# Patient Record
Sex: FEMALE | Race: WHITE | Employment: FULL TIME | URBAN - METROPOLITAN AREA
[De-identification: names, ages, dates, MRNs, and addresses within clinical notes are randomized per-mention and may not be internally consistent; named-entity substitution may affect disease eponyms.]

---

## 2017-06-30 ENCOUNTER — NURSE TRIAGE (OUTPATIENT)
Dept: NURSING | Facility: CLINIC | Age: 32
End: 2017-06-30

## 2017-06-30 NOTE — TELEPHONE ENCOUNTER
"Pt states on night of 6/28- AM of 6/29 she was out w/ friends and drank 3 bottles of beer. She reports she \"completely lost 5 hours of time\" and woke up AM of 6/29 w/ severe headache that lasted all day long. No vomiting or other sx. States she is a social drinker and when she does drink alcohol she has never experienced the above reaction before. Sx resolved completely.  She ate like usual that day, was not ill, etc. Pt asks, \"if someone put a roofie in my drink how long would it last?\" According to Micromedex, rohyprol ingested orally has onset time of 20-30 minutes with duration of 8 hours. Disc'd this with patient and advised to keep in mind everyone may react slightly differently. People can have similar reaction to alcohol itself.  She denies any signs of sexual assalt - no pain, bruising, vaginal pain/tears/bleeding.etc.. Asks if she could come in to get blood drawn to see if anything is there. Advised she may be seen at  and doctor there could order labs if appropriate. Diane Fontanez RN/FNA    Reason for Disposition    Caller has NON-URGENT medication question about med that PCP prescribed and triager unable to answer question    Additional Information    Negative: Drug overdose and nurse unable to answer question    Negative: Caller requesting information not related to medicine    Negative: Caller requesting a prescription for Strep throat and has a positive culture result    Negative: Rash while taking a medication or within 3 days of stopping it    Negative: Immunization reaction suspected    Negative: [1] Asthma and [2] having symptoms of asthma (cough, wheezing, etc)    Negative: MORE THAN A DOUBLE DOSE of a prescription or over-the-counter (OTC) drug    Negative: [1] DOUBLE DOSE (an extra dose or lesser amount) of over-the-counter (OTC) drug AND [2] any symptoms (e.g., dizziness, nausea, pain, sleepiness)    Negative: [1] DOUBLE DOSE (an extra dose or lesser amount) of prescription drug AND [2] any " "symptoms (e.g., dizziness, nausea, pain, sleepiness)    Negative: Took another person's prescription drug    Negative: [1] DOUBLE DOSE (an extra dose or lesser amount) of prescription drug AND [2] NO symptoms (Exception: a double dose of antibiotics)    Negative: Diabetes drug error or overdose (e.g., insulin or extra dose)    Negative: [1] Request for URGENT new prescription or refill of \"essential\" medication (i.e., likelihood of harm to patient if not taken) AND [2] triager unable to fill per unit policy    Negative: [1] Prescription not at pharmacy AND [2] was prescribed today by PCP    Negative: Pharmacy calling with prescription questions and triager unable to answer question    Negative: Caller has URGENT medication question about med that PCP prescribed and triager unable to answer question    Protocols used: MEDICATION QUESTION CALL-ADULT-    "

## 2017-08-19 ENCOUNTER — HEALTH MAINTENANCE LETTER (OUTPATIENT)
Age: 32
End: 2017-08-19

## 2019-11-06 ENCOUNTER — HEALTH MAINTENANCE LETTER (OUTPATIENT)
Age: 34
End: 2019-11-06

## 2020-09-07 ENCOUNTER — TELEPHONE (OUTPATIENT)
Dept: OPHTHALMOLOGY | Facility: CLINIC | Age: 35
End: 2020-09-07

## 2020-09-08 ENCOUNTER — TELEPHONE (OUTPATIENT)
Dept: OPHTHALMOLOGY | Facility: CLINIC | Age: 35
End: 2020-09-08

## 2020-09-08 NOTE — TELEPHONE ENCOUNTER
Paged by Dr. Dr. Alexander from [facility] inquiring regarding follow up.    Patient underwent injury door knob to the eye on 9/3 in [place] and some sort of surgical lacrimal repair (uncertain whether this was by ENT or ophthalmology, outside records pending). Presents with increased eye redness. Per OSH provider, patient's vision is stable since accident, pupils round reactive, and provider notes chemosis in the affected eye. Additionally provider noticing corneal abrasion on woods lamp and prescribing antibiotic ointment. No concern for iritis or RD per OSH provider. CT head wnl.     Recommended patient be evaluated by ophthalmology, provided [facility] phone number to schedule follow up. Provider was in agreement.     Elizabeth Birmingham MD  Ophthalmology PGY-2

## 2020-09-08 NOTE — TELEPHONE ENCOUNTER
Spoke to pt at [phone]  Pt seen in ED yesterday following eye injury 9-3-2020 -- on call eye provider consulted yesterday    Scheduled appt tomorrow with Dr. Duckworth    Pt with continued eye pain and blurred vision and would like to review seeing on call tonight    Note to on call for review and call back to review    Min Clayton RN 3:50 PM 09/08/20    --      Didier Faustin [phone]      Left message with direct number at [phone]    Min Clayton RN 2:32 PM 09/08/20        [facility]    Phone Message    May a detailed message be left on voicemail: yes     Reason for Call: Other: Pt was see in ER and was told to do a follow up in clinic ASAP.  Please follow up with the Pt.  Thank you.      Action Taken: Message routed to:  [facility] E[facility]    Travel Screening: Not Applicable

## 2020-09-08 NOTE — TELEPHONE ENCOUNTER
Called patient around 4:15pm. Patient reports that her vision in the affected eye is blurry and she has more pain than yesterday. She has been using the ofloxacin prescribed by the ED every 4 hours. She is concerned about the swelling of the white part of her eye and the eyelid swelling. Discussed with patient that she could come to the ED today for an eye exam if she is concerned or coming to clinic tomorrow if she can tolerate the pain and the blurry vision is stable. Patient is comfortable with coming to the clinic tomorrow. Advised the patient to call or come to the ED tonight if there is further increased pain and decreased vision. Told patient to continue using the ofloxacin eyedrop as instructed. Patient verbalized understanding and agreed with the plan.     Stalin Ricci MD  Ophthalmology PGY-3

## 2020-09-09 ENCOUNTER — OFFICE VISIT (OUTPATIENT)
Dept: OPHTHALMOLOGY | Facility: CLINIC | Age: 35
End: 2020-09-09

## 2020-09-09 ENCOUNTER — OFFICE VISIT (OUTPATIENT)
Dept: OPHTHALMOLOGY | Facility: CLINIC | Age: 35
End: 2020-09-09
Attending: OPHTHALMOLOGY

## 2020-09-09 ENCOUNTER — TELEPHONE (OUTPATIENT)
Dept: SURGERY | Facility: CLINIC | Age: 35
End: 2020-09-09

## 2020-09-09 ENCOUNTER — TELEPHONE (OUTPATIENT)
Dept: OPHTHALMOLOGY | Facility: CLINIC | Age: 35
End: 2020-09-09

## 2020-09-09 ENCOUNTER — PRE VISIT (OUTPATIENT)
Dept: SURGERY | Facility: CLINIC | Age: 35
End: 2020-09-09

## 2020-09-09 DIAGNOSIS — Z11.59 ENCOUNTER FOR SCREENING FOR OTHER VIRAL DISEASES: Primary | ICD-10-CM

## 2020-09-09 DIAGNOSIS — S01.112A: Primary | ICD-10-CM

## 2020-09-09 DIAGNOSIS — S01.112A LACERATION OF SKIN OF LEFT EYELID AND PERIOCULAR AREA, INITIAL ENCOUNTER: Primary | ICD-10-CM

## 2020-09-09 DIAGNOSIS — S05.92XA LEFT ORBIT TRAUMA, INITIAL ENCOUNTER: ICD-10-CM

## 2020-09-09 DIAGNOSIS — Z11.59 ENCOUNTER FOR SCREENING FOR OTHER VIRAL DISEASES: ICD-10-CM

## 2020-09-09 PROCEDURE — G0463 HOSPITAL OUTPT CLINIC VISIT: HCPCS | Mod: ZF

## 2020-09-09 RX ORDER — OFLOXACIN 3 MG/ML
1 SOLUTION/ DROPS OPHTHALMIC 4 TIMES DAILY
Qty: 1 BOTTLE | Refills: 0 | Status: SHIPPED | OUTPATIENT
Start: 2020-09-09 | End: 2020-09-11

## 2020-09-09 ASSESSMENT — EXTERNAL EXAM - RIGHT EYE: OD_EXAM: NORMAL

## 2020-09-09 ASSESSMENT — VISUAL ACUITY
OS_SC: 20/80
OD_SC: 20/25
OS_PH_SC+: -1
OS_PH_SC+: -1
METHOD: SNELLEN - LINEAR
OS_PH_SC: 20/60
OD_SC+: -3
OS_PH_SC: 20/60
OD_SC: J2
OS_SC: J7
METHOD: SNELLEN - LINEAR
OD_SC+: -3
OD_SC: 20/25
OS_SC: 20/80

## 2020-09-09 ASSESSMENT — SLIT LAMP EXAM - LIDS
COMMENTS: NORMAL
COMMENTS: NORMAL
COMMENTS: NASAL

## 2020-09-09 ASSESSMENT — TONOMETRY
OD_IOP_MMHG: 10
OD_IOP_MMHG: 10
OS_IOP_MMHG: 12
OS_IOP_MMHG: 12
IOP_METHOD: TONOPEN
IOP_METHOD: TONOPEN

## 2020-09-09 ASSESSMENT — CONF VISUAL FIELD
OD_NORMAL: 1
OD_NORMAL: 1
OS_NORMAL: 1
METHOD: COUNTING FINGERS
METHOD: COUNTING FINGERS
OS_NORMAL: 1

## 2020-09-09 ASSESSMENT — CUP TO DISC RATIO
OD_RATIO: 0.2
OS_RATIO: 0.2

## 2020-09-09 NOTE — TELEPHONE ENCOUNTER
Met with patient to schedule emergent left eye surgery with Dr. Loc Adan.    Surgery was scheduled on 9/11/20 at [facility]  Patient will have (virtual) H&P at [facility] on 9/9/20.     Patient is aware a COVID-19 test is needed before their procedure. The test should be with-in 4 days of their procedure.   Test Details: Date 9/9/20 Location  LAB.    Post-Op visit was scheduled on 10/21/20.    Patient is aware a / is needed day of surgery.   Surgery packet was emailed per the, patient request. Patient has my direct contact information for any further questions [phone].

## 2020-09-09 NOTE — PROGRESS NOTES
HPI  Didier Faustin is a 34 year old female here for follow up from ED after fall on 9/3/20. She hit and fell against a door knob. It injured her left eyelid. She went to ED ([facility]) in [place], [place]. She had lower eyelid surgery and the laceration was sutured close. She is unsure if she had canalicular laceration and is unsure if any tube or stent was put in. Procedure was done by ENT. She was prescribed Clindamycin (10 day course) and polysporin ointment which she started using yesterday. She is unsure if she saw an ophthalmologist in the ED. She reports her vision is blurry in left eye. She is having trouble focusing. She also has pocket of fluid on the white part of the eye. She reports that her eye is watering all the time and there is some white/yellow discharge.     POH: none  PMH: none  FHx: adopted  Meds: none    Assessment & Plan      (S01.112A) Laceration of skin of left eyelid and periocular area, initial encounter  (primary encounter diagnosis)  Comment: on exam, only the overlying skin has absorbable sutures and the canalicular system is not approximated together. No stent visible in the canalicula  Plan:   Crusting removed under slit lamp  Follow up with oculoplastics (Dr. Adan) today afternoon for possible canalicular procedure  Finish clindamycin course  Stop polytrim drops.   Start ofloxacin drops QID in left eye (unless she is prescribed another medication by oculoplastics)    (S05.92XA) Left orbit trauma, initial encounter  Comment: dilated exam is normal. Blurry vision is likely due to irregular tear film  Plan: Follow up as needed if symptoms do not resolve    -----------------------------------------------------------------------------------    Patient disposition:   Follow up with oculoplastics today afternoon    Berlin Duckworth MD  Ophthalmology Resident, PGY-3    Teaching statement:  Complete documentation of historical and exam elements from today's encounter can be found in  the full encounter summary report (not reduplicated in this progress note). I personally obtained the chief complaint(s) and history of present illness.  I confirmed and edited as necessary the review of systems, past medical/surgical history, family history, social history, and examination findings as documented by others; and I examined the patient myself. I personally reviewed the relevant tests, images, and reports as documented above.     I formulated and edited as necessary the assessment and plan and discussed the findings and management plan with the patient and family.    Krista Cantor MD  Comprehensive Ophthalmology & Ocular Pathology  Department of Ophthalmology and Visual Neurosciences  [email or web address]  Pager [phone]

## 2020-09-09 NOTE — NURSING NOTE
Chief Complaints and History of Present Illnesses   Patient presents with     Eye Pain Left Eye     Chief Complaint(s) and History of Present Illness(es)     Eye Pain Left Eye     Laterality: In left eye    Pain scale: 5/10              Comments     Didier is here to follow up from ED after eye trauma LE. She tripped over her dog and onto a door latch that entered her LE on 9-3 in [place]. She feels her vision LE has decreased, and blurry. She is also suffering headaches and some dizziness.  She also adds  the area around her caruncle is very itchy. She says there are stiches around that area as well.     Geoffrey Jude COT 9:12 AM September 9, 2020     Didier Faustin is a 34 year old female here for follow up from ED after fall on 9/3/20. She hit and fell against a door knob. It injured her left eyelid. She went to ED ([facility]) in [place], [place]. She had lower eyelid surgery and the laceration was sutured close. She is unsure if she had canalicular laceration and is unsure if any tube or stent was put in. Procedure was done by ENT. She was prescribed Clindamycin (10 day course) and polysporin ointment which she started using yesterday. She is unsure if she saw an ophthalmologist in the ED. She reports her vision is blurry in left eye. She is having trouble focusing. She also has pocket of fluid on the white part of the eye. She reports that her eye is watering all the time and there is some white/yellow discharge.     Berlin Carver's note from earlier today.

## 2020-09-09 NOTE — TELEPHONE ENCOUNTER
FUTURE VISIT INFORMATION      SURGERY INFORMATION:    Date: 20    Location: [facility] OR    Surgeon:  Loc Adan MD     Anesthesia Type:  Combined MAC with Local     Procedure: left lower eyelid canalicular laceration repair with silicone intubation     Consult: OV     RECORDS REQUESTED FROM:       [facility] Care Provider: [facility]    Most recent EKG+ Tracin17- [facility]

## 2020-09-09 NOTE — NURSING NOTE
Chief Complaints and History of Present Illnesses   Patient presents with     Eye Trauma Left Eye     Chief Complaint(s) and History of Present Illness(es)     Eye Trauma Left Eye     Laterality: left eye    Associated signs and symptoms: eye pain (LE)    Pain scale: 5/10              Comments     Didier is here to follow up from ED after eye trauma LE. She tripped over her dog and onto a door latch that entered her LE on 9-3 in [place]. She feels her vision LE has decreased, and blurry. She is also suffering headaches and some dizziness.  She also adds  the area around her caruncle is very itchy. She says there are stiches around that area as well.     Geoffrey Roque COT 9:12 AM September 9, 2020

## 2020-09-09 NOTE — PROGRESS NOTES
Chief Complaints and History of Present Illnesses   Patient presents with     Eye Pain Left Eye     Chief Complaint(s) and History of Present Illness(es)     Eye Pain Left Eye       In left eye.  Pain was noted as 5/10.              Comments     Didier is here to follow up from ED after eye trauma LE. She tripped over   her dog and onto a door latch that entered her LE on 9-3 in [place]. She   feels her vision LE has decreased, and blurry. She is also suffering   headaches and some dizziness.  She also adds  the area around her caruncle   is very itchy. She says there are stiches around that area as well.     Geoffery Roque COT 9:12 AM September 9, 2020     Didier Faustin is a 34 year old female here for follow up from ED after   fall on 9/3/20. She hit and fell against a door knob. It injured her left   eyelid. She went to ED ([facility]) in [place], [place]. She had   lower eyelid surgery and the laceration was sutured close. She is unsure   if she had canalicular laceration and is unsure if any tube or stent was   put in. Procedure was done by ENT. She was prescribed Clindamycin (10 day   course) and polysporin ointment which she started using yesterday. She is   unsure if she saw an ophthalmologist in the ED. She reports her vision is   blurry in left eye. She is having trouble focusing. She also has pocket of   fluid on the white part of the eye. She reports that her eye is watering   all the time and there is some white/yellow discharge.     Berlin Carver's note from earlier today.                    Assessment & Plan     Didier Faustin is a 34 year old female with the following diagnoses:   1. Canalicular laceration, left, initial encounter - Left Eye         Lower lower eyelid laceration involving canaliculus confirmed on irrigation today.  Plan for repair in OR on Friday at [facility].  Risks, benefits, alternatives discussed, questions answered, consent signed, she would like to proceed with surgery as soon  as possible.    Attending Physician Attestation: Complete documentation of historical and exam elements from today's encounter can be found in the full encounter summary report (not reduplicated in this progress note). I personally obtained the chief complaint(s) and history of present illness. I confirmed and edited as necessary the review of systems, past medical/surgical history, family history, social history, and examination findings as documented by others; and I examined the patient myself. I personally reviewed the relevant tests, images, and reports as documented above. I formulated and edited as necessary the assessment and plan and discussed the findings and management plan with the patient.  -MD Loc Macias MD, MPH  Oculoplastics Fellow

## 2020-09-10 ENCOUNTER — VIRTUAL VISIT (OUTPATIENT)
Dept: SURGERY | Facility: CLINIC | Age: 35
End: 2020-09-10

## 2020-09-10 ENCOUNTER — ANESTHESIA EVENT (OUTPATIENT)
Dept: SURGERY | Facility: AMBULATORY SURGERY CENTER | Age: 35
End: 2020-09-10

## 2020-09-10 ENCOUNTER — PRE VISIT (OUTPATIENT)
Dept: SURGERY | Facility: CLINIC | Age: 35
End: 2020-09-10

## 2020-09-10 VITALS — WEIGHT: 145 LBS | BODY MASS INDEX: 21.48 KG/M2 | HEIGHT: 69 IN

## 2020-09-10 DIAGNOSIS — Z01.818 PRE-OP EXAMINATION: Primary | ICD-10-CM

## 2020-09-10 RX ORDER — ACETAMINOPHEN 325 MG/1
325-650 TABLET ORAL EVERY 6 HOURS PRN
COMMUNITY

## 2020-09-10 ASSESSMENT — PAIN SCALES - GENERAL: PAINLEVEL: SEVERE PAIN (6)

## 2020-09-10 ASSESSMENT — ENCOUNTER SYMPTOMS: SEIZURES: 0

## 2020-09-10 ASSESSMENT — LIFESTYLE VARIABLES: TOBACCO_USE: 1

## 2020-09-10 ASSESSMENT — MIFFLIN-ST. JEOR: SCORE: 1412.1

## 2020-09-10 NOTE — PROGRESS NOTES
"Didier Faustin is a 34 year old female who is being evaluated via a billable video visit.      The patient has been notified of following:     \"This video visit will be conducted via a call between you and your physician/provider. We have found that certain health care needs can be provided without the need for an in-person physical exam.  This service lets us provide the care you need with a video conversation.  If a prescription is necessary we can send it directly to your pharmacy.  If lab work is needed we can place an order for that and you can then stop by our lab to have the test done at a later time.    Video visits are billed at different rates depending on your insurance coverage.  Please reach out to your insurance provider with any questions.    If during the course of the call the physician/provider feels a video visit is not appropriate, you will not be charged for this service.\"    Patient has given verbal consent for Video visit? Yes  How would you like to obtain your AVS? MyChart  If you are dropped from the video visit, the video invite should be resent to: [phone]  Will anyone else be joining your video visit? No        Carlos Harrison      "

## 2020-09-10 NOTE — TELEPHONE ENCOUNTER
Received a message from the PAC clinic informing me that the patient missed her PAC appointment.    Called patient today to reschedule her H&P with PAC on 9/10/20.

## 2020-09-10 NOTE — PATIENT INSTRUCTIONS
Preparing for Your Surgery      Name:  Didier Faustin   MRN:  [id number]   :  1985   Today's Date:  9/10/2020         Arriving for surgery:  Surgery date:  20  Arrival time:  6:55 am    Restrictions due to COVID 19:  No visitors at the Surgery Center   parking is not available     Please come to:    [facility]  [address] [place] [id number]    Please check in on the 5th floor at the Ambulatory Surgery Center     What can I eat or drink?    -  You may eat and drink normally until 8 hours before surgery. (Until 12 midnight)  -  You may have clear liquids up to 4 hours before surgery. (Until 4:25 am)  Examples of clear liquids:  Water  Clear broth  Juices (apple, white grape, white cranberry  and cider) without pulp  Noncarbonated, powder based beverages  (lemonade and Yonis-Aid)  Sodas (Sprite, 7-Up, ginger ale and seltzer)  Coffee or tea (without milk or cream)  Gatorade    --No alcohol for at least 24 hours before surgery    Which medicines can I take?    Hold aspirin for 7 days before surgery.   Hold multivitamins for 7 days before surgery.  Hold supplements for 7 days before surgery.  Hold Ibuprofen (Advil, Motrin) for 1 day before surgery--unless otherwise directed by surgeon.  Hold Naproxen (Aleve) for 4 days before surgery.      -  PLEASE TAKE the following medications the day of surgery   Ocuflox eye drops  Acetaminophen (Tylenol) if needed    How do I prepare myself?  - Please take 2 showers before surgery using Scrubcare or Hibiclens soap.    Use this soap only from the neck to your toes.     Leave the soap on your skin for one minute--then rinse thoroughly.      You may use your own shampoo and conditioner; no other hair products.   - Please remove all jewelry and body piercings.  - No lotions, deodorants or fragrance.  - No makeup or fingernail polish.   - Bring your ID and insurance card.        - All patients are required to have a Covid-19 test  within 4 days of surgery/procedure.      -Patients will be contacted by the [facility] scheduling team within 1 week of surgery to make an appointment.      - Patients may call the Scheduling team at [phone] if they have not been scheduled within 4 days of  surgery.      ALL PATIENTS ARE REQUIRED TO HAVE A RESPONSIBLE ADULT TO DRIVE AND BE IN ATTENDANCE WITH THEM FOR 24 HOURS FOLLOWING SURGERY       Questions or Concerns:    -For questions regarding the day of surgery please contact the Ambulatory Surgery Center at [phone].    -If you have health changes between today and your surgery please contact your surgeon.     For questions after surgery please call your surgeons office.    AFTER YOUR SURGERY  Breathing exercises   Breathing exercises help you recover faster. Take deep breaths and let the air out slowly. This will:     Help you wake up after surgery.    Help prevent complications like pneumonia.  Preventing complications will help you go home sooner.   Nausea and vomiting   You may feel sick to your stomach after surgery; if so, let your nurse know.    Pain control:  After surgery, you may have pain. Our goal is to help you manage your pain. Pain medicine will help you feel comfortable enough to do activities that will help you heal.  These activities may include breathing exercises, walking and physical therapy.   To help your health care team treat your pain we will ask: 1) If you have pain  2) where it is located 3) describe your pain in your words  Methods of pain control include medications given by mouth, vein or by nerve block for some surgeries.  Sequential Compression Device (SCD):  You may need to wear SCD S (also called pneumo boots)on your legs or feet. These are wraps connected to a machine that pumps in air and releases it. The repeated pumping helps prevent blood clots from forming.

## 2020-09-10 NOTE — ANESTHESIA PREPROCEDURE EVALUATION
"Anesthesia Pre-Procedure Evaluation    Patient: Didier Faustin   MRN:     [id number] Gender:   female   Age:    34 year old :      1985        Preoperative Diagnosis: Canalicular laceration, left, initial encounter [S01.112A]   Procedure(s):  left lower eyelid canalicular laceration repair with silicone intubation     LABS:  CBC:   Lab Results   Component Value Date    WBC 5.8 2012    HGB 15.2 2012    HCT 43.7 2012     2012     BMP: No results found for: NA, POTASSIUM, CHLORIDE, CO2, BUN, CR, GLC  COAGS: No results found for: PTT, INR, FIBR  POC: No results found for: BGM, HCG, HCGS  OTHER:   Lab Results   Component Value Date    CRP <5.0 2012    SED 6 2012        Preop Vitals    BP Readings from Last 3 Encounters:   14 102/66   13 103/66   12 104/65    Pulse Readings from Last 3 Encounters:   13 64   12 66   12 100      Resp Readings from Last 3 Encounters:   12 12   12 12    SpO2 Readings from Last 3 Encounters:   12 99%   12 97%      Temp Readings from Last 1 Encounters:   14 98.2  F (36.8  C) (Oral)    Ht Readings from Last 1 Encounters:   09/10/20 1.753 m (5' 9\")      Wt Readings from Last 1 Encounters:   09/10/20 65.8 kg (145 lb)    Estimated body mass index is 21.41 kg/m  as calculated from the following:    Height as of this encounter: 1.753 m (5' 9\").    Weight as of this encounter: 65.8 kg (145 lb).     LDA:        Past Medical History:   Diagnosis Date     Abnormal Pap smear of cervix     benign atypical cells, no treatment     Exercise-induced asthma      Insomnia      IUD (intrauterine device) in place     Mirena     Tobacco abuse       Past Surgical History:   Procedure Laterality Date     C/SECTION, LOW TRANSVERSE       LUMPECTOMY BREAST       TONSILLECTOMY        Allergies   Allergen Reactions     Nkda [No Known Drug Allergies]         Anesthesia Evaluation     . Pt has had " prior anesthetic. Type: General and Regional    History of anesthetic complications    The patient had spinal with csection but could feel the incision being made so then had general anesthesia. Anxiety with anesthesia due to that experience      ROS/MED HX    ENT/Pulmonary:     (+)other ENT- canalicular laceration , tobacco use (patient was smoking 0.5 ppd but down to a few cigarettes per day), Current use Intermittent asthma (exercise induced) , . .    Neurologic:  - neg neurologic ROS    (-) seizures, CVA, TIA and migraines   Cardiovascular:     (+) ----. : . . . :. . Previous cardiac testing date:results:date: results:ECG reviewed date:2017 results:Sinus bradycardia with short IA date: results:          METS/Exercise Tolerance:  4 - Raking leaves, gardening   Hematologic:  - neg hematologic  ROS       Musculoskeletal:   (+)  other musculoskeletal- back pain       GI/Hepatic:  - neg GI/hepatic ROS      (-) GERD   Renal/Genitourinary:  - ROS Renal section negative       Endo:  - neg endo ROS       Psychiatric:     (+) psychiatric history other (comment) (insomnia)      Infectious Disease:  - neg infectious disease ROS       Malignancy:      - no malignancy   Other:    (+) no H/O Chronic Pain,no other significant disability                        PHYSICAL EXAM:   Mental Status/Neuro: A/A/O   Airway: Facies: Feasible  Mallampati: II   Respiratory: Auscultation: CTAB      CV: Rhythm: Regular   Comments:                      Assessment:   ASA SCORE: 1    H&P: History and physical reviewed and following examination; no interval change.    NPO Status: NPO Appropriate     Plan:   Anes. Type:  MAC   Pre-Medication: None   Induction:  N/a   Airway: Native Airway   Access/Monitoring: PIV   Maintenance: N/a     Postop Plan:   Postop Pain: None  Postop Sedation/Airway: Not planned     PONV Management: Adult Risk Factors: Female   Prevention: Ondansetron, Dexamethasone     CONSENT: Direct conversation   Plan and risks  discussed with: Patient                   PAC Discussion and Assessment    ASA Classification: 2  Case is suitable for: ASC  Anesthetic techniques and relevant risks discussed: MAC with GA as backup  Invasive monitoring and risk discussed:   Types:   Possibility and Risk of blood transfusion discussed:   NPO instructions given:   Additional anesthetic preparation and risks discussed:   Needs early admission to pre-op area:   Other:     PAC Resident/NP Anesthesia Assessment:  Didier is a 34 year old woman who is scheduled for left lower eyelid canalicular laceration repair with silicone intubation on 9/11/20 by Dr. Adan in treatment of Canalicular laceration, left, initial encounter.  PAC referral for risk assessment and optimization for anesthesia with comorbid conditions of asthma, smoking, back pain, insomnia and depression:    Pre-operative considerations:  1.  Cardiac:  Functional status- METS 4, the patient can go up stairs and walk at a steady pace for a long period of time. Due to her exercise induced asthma that she doesn't treat she will get short of breath with intense elliptical or treadmill walking after 5 minutes.  Low risk surgery with 0.4 (RCRI #) risk of major adverse cardiac event. The patient has no cardiac diagnosis and recent EKG in 2017. No further testing indicated.     2.  Pulm:  Airway feasible.  ARIK risk: Low  ~ Asthma - exercise induced. The patient reports she has been treated with albuterol in the past but doesn't know where her inhaler is and hasn't used this for a long time. She only gets short of breath with intense exercise  ~ Smoking - History of smoking 0.5 ppd but has decreased to a few cigarettes per day. We discussed smoking cessation and the patient agrees to not smoke the day of surgery.     3. ENT: Canalicular laceration - continue ofloxacin drops and procedure as above.     4. GI:  Risk of PONV score = 3.  If > 2, anti-emetic intervention recommended.    5. Psych: Insomnia -  has been on medications in the past but nothing currently.   ~ Anxiety - with anesthesia. The patient had a spinal for her csection but then felt the incision being made so had general anesthesia. Given this experiences she has anxiety with anesthesia.     6. Musculoskeletal: Back pain - secondary to work. Consideration for careful positioning to minimize discomfort.     VTE risk: 0.26%    **Please refer to the physical examination documented by the anesthesiologist in the anesthesia record on the day of surgery**      Patient is optimized and is acceptable candidate for the proposed procedure.  No further diagnostic evaluation is needed.     For further details of assessment, testing, and physical exam please see H and P completed on same date.    Peg Rdoriguez PA-C        Mid-Level Provider/Resident: Peg Rodriguez PA-C  Date: 9/10/20  Time:     Attending Anesthesiologist Anesthesia Assessment:        Anesthesiologist:   Date:   Time:   Pass/Fail:   Disposition:     PAC Pharmacist Assessment:        Pharmacist:   Date:   Time:    Peg Rodriguez PA-C

## 2020-09-10 NOTE — TELEPHONE ENCOUNTER
FUTURE VISIT INFORMATION        SURGERY INFORMATION:    Date: 20    Location: [facility] OR    Surgeon:  Loc Adan MD     Anesthesia Type:  Combined MAC with Local     Procedure: left lower eyelid canalicular laceration repair with silicone intubation     Consult: OV      RECORDS REQUESTED FROM:         Primary Care Provider: [facility]     Most recent EKG+ Tracin17- [facility]

## 2020-09-10 NOTE — H&P
Pre-Operative H & P     CC:  Preoperative exam to assess for increased cardiopulmonary risk while undergoing surgery and anesthesia.    Date of Encounter: 9/10/2020  Primary Care Physician:  No Ref-Primary, Physician     Reason for visit: pre operative examination, Canalicular laceration, left, initial encounter    HPI  Didier Faustin is a 34 year old female who presents for pre-operative H & P in preparation for left lower eyelid canalicular laceration repair with silicone intubation with Dr. Adan on 9/11/20 at [facility].     The patient is a 34 year old woman who has a past medical history significant for asthma, smoking, back pain, insomnia and depression. She was in [place] when she tripped and hit her eye on a door knob. She was seen in the ER in 9/3/20 at which time sutures were placed. The patient then presented to the ER in [place] on 9/7/20. At that time she complained of blurry vision, headaches and dizziness. CT of the head and orbit were obtained along with labs. She was given a topical antibiotic and referred to be seen by ophthalmology. She was seen yesterday in the clinic and found to have a canalicular laceration. She has now been scheduled for the procedure as above.     History is obtained from the patient and chart review    Past Medical History  Past Medical History:   Diagnosis Date     Abnormal Pap smear of cervix 2001    benign atypical cells, no treatment     Canalicular laceration      Exercise-induced asthma      Insomnia      IUD (intrauterine device) in place     Mirena     Tobacco abuse        Past Surgical History  Past Surgical History:   Procedure Laterality Date     AS REPAIR CRUCIATE LIGAMENT,KNEE  2017     AS REPAIR CRUCIATE LIGAMENT,KNEE  2018     C/SECTION, LOW TRANSVERSE  2002     LUMPECTOMY BREAST       TONSILLECTOMY         Hx of Blood transfusions/reactions: denies     Hx of abnormal bleeding or anti-platelet use: none    Menstrual history: No  LMP recorded. (Menstrual status: IUD).: patient hasn't had menstrual cycle for 7 years with IUD    Steroid use in the last year: none    Personal or FH with difficulty with Anesthesia:  Spinal did not work for csection for patient. Otherwise no issues with anesthesia.     Prior to Admission Medications  Current Outpatient Medications   Medication Sig Dispense Refill     acetaminophen (TYLENOL) 325 MG tablet Take 325-650 mg by mouth every 6 hours as needed for mild pain       levonorgestrel (MIRENA) 20 MCG/24HR IUD 1 each by Intrauterine route once. 1 each 0     ofloxacin (OCUFLOX) 0.3 % ophthalmic solution Place 1 drop Into the left eye 4 times daily 1 Bottle 0       Allergies  Allergies   Allergen Reactions     Nkda [No Known Drug Allergies]        Social History  Social History     Socioeconomic History     Marital status: Single     Spouse name: Not on file     Number of children: Not on file     Years of education: Not on file     Highest education level: Not on file   Occupational History     Occupation:    Social Needs     Financial resource strain: Not on file     Food insecurity     Worry: Not on file     Inability: Not on file     Transportation needs     Medical: Not on file     Non-medical: Not on file   Tobacco Use     Smoking status: Current Every Day Smoker     Types: Cigarettes     Smokeless tobacco: Never Used     Tobacco comment: 1/16 using chantix,smoking 1 cig per day   Substance and Sexual Activity     Alcohol use: Not Currently     Comment: sober for almost a year      Drug use: No     Sexual activity: Yes     Partners: Male     Birth control/protection: I.U.D.   Lifestyle     Physical activity     Days per week: Not on file     Minutes per session: Not on file     Stress: Not on file   Relationships     Social connections     Talks on phone: Not on file     Gets together: Not on file     Attends Caodaism service: Not on file     Active member of club or organization: Not on file     " Attends meetings of clubs or organizations: Not on file     Relationship status: Not on file     Intimate partner violence     Fear of current or ex partner: Not on file     Emotionally abused: Not on file     Physically abused: Not on file     Forced sexual activity: Not on file   Other Topics Concern     Parent/sibling w/ CABG, MI or angioplasty before 65F 55M? Not Asked   Social History Narrative     Not on file       Family History  Family History   Adopted: Yes   Problem Relation Age of Onset     Arthritis Maternal Grandmother         RA     Neurologic Disorder Mother         MS       Review of Systems    ROS/MED HX    ENT/Pulmonary:     (+)other ENT- canalicular laceration , tobacco use (patient was smoking 0.5 ppd but down to a few cigarettes per day), Current use Intermittent asthma (exercise induced) , . .    Neurologic:  - neg neurologic ROS    (-) seizures, CVA, TIA and migraines   Cardiovascular:     (+) ----. : . . . :. . Previous cardiac testing date:results:date: results:ECG reviewed date:2017 results:Sinus bradycardia with short DE date: results:          METS/Exercise Tolerance:  4 - Raking leaves, gardening   Hematologic:  - neg hematologic  ROS       Musculoskeletal:   (+)  other musculoskeletal- back pain       GI/Hepatic:  - neg GI/hepatic ROS      (-) GERD   Renal/Genitourinary:  - ROS Renal section negative       Endo:  - neg endo ROS       Psychiatric:     (+) psychiatric history other (comment) (insomnia)      Infectious Disease:  - neg infectious disease ROS       Malignancy:      - no malignancy   Other:    (+) no H/O Chronic Pain,no other significant disability        The complete review of systems is negative other than noted in the HPI or here.                         145 lbs 0 oz  5' 9\"   Body mass index is 21.41 kg/m .       Physical Exam  Constitutional: Awake, alert, cooperative, no apparent distress, and appears stated age.  HENT: Normocephalic, left eye ecchymosis and swelling, " facial piercing's   Respiratory: non labored breathing   Neurologic: Awake, alert, oriented to name, place and time.   Neuropsychiatric: Calm, cooperative. Normal affect.     Labs: (personally reviewed)    BASIC METABOLIC PANEL (09/07/2020 8:46 PM CDT)  BASIC METABOLIC PANEL (09/07/2020 8:46 PM CDT)   Component Value Ref Range Performed At Pathologist Signature   SODIUM 141 135 - 145 mmol/L [facility]     POTASSIUM 3.9 3.5 - 5.0 mmol/L [facility]     CHLORIDE 107 98 - 110 mmol/L [facility]     CO2,TOTAL 24 21 - 31 mmol/L [facility]     ANION GAP 10 5 - 18  [facility]     GLUCOSE 157 (H) 65 - 100 mg/dL [facility]     CALCIUM 9.6 8.5 - 10.5 mg/dL [facility]     BUN 11 8 - 25 mg/dL [facility]     CREATININE 1.05 0.57 - 1.11 mg/dL [facility]     BUN/CREAT RATIO  10 10 - 20  [facility]     GFR if African American >60 >60 ml/min/1.73m2 [facility]     GFR if not African American 59 (L) >60 ml/min/1.73m2 [facility]       CBC WITH AUTO DIFFERENTIAL (09/07/2020 8:46 PM CDT)  CBC WITH AUTO DIFFERENTIAL (09/07/2020 8:46 PM CDT)   Component Value Ref Range Performed At Pathologist Signature   WHITE BLOOD COUNT  8.4 4.5 - 11.0 thou/cu mm [facility]     RED BLOOD COUNT  4.52 4.00 - 5.20 mil/cu mm [facility]     HEMOGLOBIN  13.9 12.0 - 16.0 g/dL [facility]     HEMATOCRIT  41.7 33.0 - 51.0 % [facility]     MCV  92 80 - 100 fL [facility]     MCH  30.8 26.0 - 34.0 pg [facility]     MCHC  33.3 32.0 - 36.0 g/dL [facility]     RDW  12.5 11.5 - 15.5 % [facility]     PLATELET COUNT  285 140 - 440 thou/cu mm [facility]     MPV   10.2 6.5 - 11.0 fL [facility]     NEUTROPHILS  51.2 % [facility]     LYMPHOCYTES  39.8 % [facility]     MONOCYTES  7.9 % [facility]     EOSINOPHILS  0.9 % [facility]     BASOPHILS  0.2 % [facility]     ABSOLUTE NEUTROPHILS  4.3 1.7 - 7.0 thou/cu mm [facility]     ABSOLUTE LYMPHOCYTES  3.4 (H) 0.9 - 2.9 thou/cu mm [facility]     ABSOLUTE MONOCYTES  0.7 <0.9 thou/cu mm [facility]     ABSOLUTE EOSINOPHILS  0.1 <0.5 thou/cu mm [facility]     ABSOLUTE BASOPHILS  0.0 <0.3 thou/cu mm [facility]       EK17  Sinus bradycardia with short MI  Otherwise normal ECG    The patient's records and results personally reviewed by this provider.     Outside records reviewed from: care everywhere     ASSESSMENT and PLAN  Didier is a 34 year old woman who is scheduled for left lower eyelid canalicular laceration repair with silicone intubation on 20 by Dr. Adan in treatment of Canalicular laceration, left, initial encounter.  PAC referral for risk assessment and optimization for anesthesia with comorbid conditions of asthma, smoking, back pain, insomnia and depression:    Pre-operative considerations:  1.  Cardiac:  Functional status- METS 4, the patient can go up stairs and walk at a steady pace for a long period of time. Due to her exercise induced asthma that she doesn't treat she will get short of breath with intense elliptical or treadmill walking after 5 minutes.  Low risk surgery with 0.4 (RCRI #) risk of major adverse cardiac event. The patient has no cardiac diagnosis and recent EKG in 2017. No further testing indicated.     2.  Pulm:  Airway feasible.  ARIK risk: Low  ~ Asthma - exercise induced. The patient reports she has been treated with albuterol in the past but doesn't know where her inhaler is  and hasn't used this for a long time. She only gets short of breath with intense exercise  ~ Smoking - History of smoking 0.5 ppd but has decreased to a few cigarettes per day. We discussed smoking cessation and the patient agrees to not smoke the day of surgery.     3. ENT: Canalicular laceration - continue ofloxacin drops and procedure as above.     4. GI:  Risk of PONV score = 3.  If > 2, anti-emetic intervention recommended.    5. Psych: Insomnia - has been on medications in the past but nothing currently.   ~ Anxiety - with anesthesia. The patient had a spinal for her csection but then felt the incision being made so had general anesthesia. Given this experiences she has anxiety with anesthesia.     6. Musculoskeletal: Back pain - secondary to work. Consideration for careful positioning to minimize discomfort.     VTE risk: 0.26%    **Please refer to the physical examination documented by the anesthesiologist in the anesthesia record on the day of surgery**    The patient is optimized for their procedure. AVS with information on surgery time/arrival time, meds and NPO status given by nursing staff.          Video-Visit Details    Type of service:  Video Visit    Patient verbally consented to video service today: YES      Video Start Time: 12:58  Video End Time (time video stopped): 1:13    Originating Location (pt. Location): Home    Distant Location (provider location):  [facility] PREOPERATIVE ASSESSMENT [facility]     Mode of Communication:  Video Conference via Radha Rodriguez PA-C  Preoperative Assessment Center  [facility]  Phone: [phone]  Fax: [phone]

## 2020-09-11 ENCOUNTER — HOSPITAL ENCOUNTER (OUTPATIENT)
Facility: AMBULATORY SURGERY CENTER | Age: 35
End: 2020-09-11
Attending: OPHTHALMOLOGY

## 2020-09-11 ENCOUNTER — ANESTHESIA (OUTPATIENT)
Dept: SURGERY | Facility: AMBULATORY SURGERY CENTER | Age: 35
End: 2020-09-11

## 2020-09-11 VITALS
BODY MASS INDEX: 21.48 KG/M2 | SYSTOLIC BLOOD PRESSURE: 119 MMHG | TEMPERATURE: 97.7 F | DIASTOLIC BLOOD PRESSURE: 77 MMHG | OXYGEN SATURATION: 96 % | WEIGHT: 145 LBS | HEART RATE: 85 BPM | RESPIRATION RATE: 18 BRPM | HEIGHT: 69 IN

## 2020-09-11 DIAGNOSIS — S01.112A: ICD-10-CM

## 2020-09-11 LAB
HCG UR QL: NEGATIVE
INTERNAL QC OK POCT: YES
SARS-COV-2 RNA SPEC QL NAA+PROBE: NOT DETECTED
SPECIMEN SOURCE: NORMAL

## 2020-09-11 DEVICE — EYE IMP KIT LACRIMAL INTUBATION MINI MONOKA S1.1500: Type: IMPLANTABLE DEVICE | Site: EYE | Status: FUNCTIONAL

## 2020-09-11 RX ORDER — PROPOFOL 10 MG/ML
INJECTION, EMULSION INTRAVENOUS PRN
Status: DISCONTINUED | OUTPATIENT
Start: 2020-09-11 | End: 2020-09-11

## 2020-09-11 RX ORDER — ONDANSETRON 2 MG/ML
4 INJECTION INTRAMUSCULAR; INTRAVENOUS EVERY 30 MIN PRN
Status: DISCONTINUED | OUTPATIENT
Start: 2020-09-11 | End: 2020-09-12 | Stop reason: HOSPADM

## 2020-09-11 RX ORDER — ONDANSETRON 4 MG/1
4 TABLET, ORALLY DISINTEGRATING ORAL EVERY 30 MIN PRN
Status: DISCONTINUED | OUTPATIENT
Start: 2020-09-11 | End: 2020-09-12 | Stop reason: HOSPADM

## 2020-09-11 RX ORDER — FENTANYL CITRATE 50 UG/ML
25-50 INJECTION, SOLUTION INTRAMUSCULAR; INTRAVENOUS
Status: DISCONTINUED | OUTPATIENT
Start: 2020-09-11 | End: 2020-09-11 | Stop reason: HOSPADM

## 2020-09-11 RX ORDER — LIDOCAINE HYDROCHLORIDE 20 MG/ML
INJECTION, SOLUTION INFILTRATION; PERINEURAL PRN
Status: DISCONTINUED | OUTPATIENT
Start: 2020-09-11 | End: 2020-09-11

## 2020-09-11 RX ORDER — GABAPENTIN 300 MG/1
300 CAPSULE ORAL ONCE
Status: COMPLETED | OUTPATIENT
Start: 2020-09-11 | End: 2020-09-11

## 2020-09-11 RX ORDER — CEPHALEXIN 500 MG/1
500 CAPSULE ORAL 2 TIMES DAILY
Qty: 14 CAPSULE | Refills: 0 | Status: SHIPPED | OUTPATIENT
Start: 2020-09-11 | End: 2020-09-18

## 2020-09-11 RX ORDER — TETRACAINE HYDROCHLORIDE 5 MG/ML
SOLUTION OPHTHALMIC PRN
Status: DISCONTINUED | OUTPATIENT
Start: 2020-09-11 | End: 2020-09-11 | Stop reason: HOSPADM

## 2020-09-11 RX ORDER — ERYTHROMYCIN 5 MG/G
OINTMENT OPHTHALMIC PRN
Status: DISCONTINUED | OUTPATIENT
Start: 2020-09-11 | End: 2020-09-11 | Stop reason: HOSPADM

## 2020-09-11 RX ORDER — NALOXONE HYDROCHLORIDE 0.4 MG/ML
.1-.4 INJECTION, SOLUTION INTRAMUSCULAR; INTRAVENOUS; SUBCUTANEOUS
Status: DISCONTINUED | OUTPATIENT
Start: 2020-09-11 | End: 2020-09-12 | Stop reason: HOSPADM

## 2020-09-11 RX ORDER — ONDANSETRON 2 MG/ML
INJECTION INTRAMUSCULAR; INTRAVENOUS PRN
Status: DISCONTINUED | OUTPATIENT
Start: 2020-09-11 | End: 2020-09-11

## 2020-09-11 RX ORDER — LIDOCAINE HYDROCHLORIDE AND EPINEPHRINE 10; 10 MG/ML; UG/ML
INJECTION, SOLUTION INFILTRATION; PERINEURAL PRN
Status: DISCONTINUED | OUTPATIENT
Start: 2020-09-11 | End: 2020-09-11 | Stop reason: HOSPADM

## 2020-09-11 RX ORDER — SODIUM CHLORIDE, SODIUM LACTATE, POTASSIUM CHLORIDE, CALCIUM CHLORIDE 600; 310; 30; 20 MG/100ML; MG/100ML; MG/100ML; MG/100ML
INJECTION, SOLUTION INTRAVENOUS CONTINUOUS
Status: DISCONTINUED | OUTPATIENT
Start: 2020-09-11 | End: 2020-09-12 | Stop reason: HOSPADM

## 2020-09-11 RX ORDER — PROPOFOL 10 MG/ML
INJECTION, EMULSION INTRAVENOUS CONTINUOUS PRN
Status: DISCONTINUED | OUTPATIENT
Start: 2020-09-11 | End: 2020-09-11

## 2020-09-11 RX ORDER — ERYTHROMYCIN 5 MG/G
OINTMENT OPHTHALMIC
Qty: 3.5 G | Refills: 0 | Status: SHIPPED | OUTPATIENT
Start: 2020-09-11

## 2020-09-11 RX ORDER — SODIUM CHLORIDE, SODIUM LACTATE, POTASSIUM CHLORIDE, CALCIUM CHLORIDE 600; 310; 30; 20 MG/100ML; MG/100ML; MG/100ML; MG/100ML
INJECTION, SOLUTION INTRAVENOUS CONTINUOUS
Status: DISCONTINUED | OUTPATIENT
Start: 2020-09-11 | End: 2020-09-11 | Stop reason: HOSPADM

## 2020-09-11 RX ORDER — ACETAMINOPHEN 325 MG/1
975 TABLET ORAL ONCE
Status: COMPLETED | OUTPATIENT
Start: 2020-09-11 | End: 2020-09-11

## 2020-09-11 RX ORDER — FENTANYL CITRATE 50 UG/ML
INJECTION, SOLUTION INTRAMUSCULAR; INTRAVENOUS PRN
Status: DISCONTINUED | OUTPATIENT
Start: 2020-09-11 | End: 2020-09-11

## 2020-09-11 RX ORDER — MEPERIDINE HYDROCHLORIDE 25 MG/ML
12.5 INJECTION INTRAMUSCULAR; INTRAVENOUS; SUBCUTANEOUS
Status: DISCONTINUED | OUTPATIENT
Start: 2020-09-11 | End: 2020-09-12 | Stop reason: HOSPADM

## 2020-09-11 RX ORDER — LIDOCAINE 40 MG/G
CREAM TOPICAL
Status: DISCONTINUED | OUTPATIENT
Start: 2020-09-11 | End: 2020-09-11 | Stop reason: HOSPADM

## 2020-09-11 RX ORDER — NEOMYCIN POLYMYXIN B SULFATES AND DEXAMETHASONE 3.5; 10000; 1 MG/ML; [USP'U]/ML; MG/ML
1 SUSPENSION/ DROPS OPHTHALMIC 4 TIMES DAILY
Qty: 5 ML | Refills: 0 | Status: SHIPPED | OUTPATIENT
Start: 2020-09-11 | End: 2020-09-18

## 2020-09-11 RX ORDER — OXYCODONE HYDROCHLORIDE 5 MG/1
5 TABLET ORAL EVERY 4 HOURS PRN
Status: DISCONTINUED | OUTPATIENT
Start: 2020-09-11 | End: 2020-09-12 | Stop reason: HOSPADM

## 2020-09-11 RX ADMIN — SODIUM CHLORIDE, SODIUM LACTATE, POTASSIUM CHLORIDE, CALCIUM CHLORIDE: 600; 310; 30; 20 INJECTION, SOLUTION INTRAVENOUS at 13:34

## 2020-09-11 RX ADMIN — SODIUM CHLORIDE, SODIUM LACTATE, POTASSIUM CHLORIDE, CALCIUM CHLORIDE: 600; 310; 30; 20 INJECTION, SOLUTION INTRAVENOUS at 13:30

## 2020-09-11 RX ADMIN — PROPOFOL 150 MCG/KG/MIN: 10 INJECTION, EMULSION INTRAVENOUS at 13:38

## 2020-09-11 RX ADMIN — PROPOFOL 30 MG: 10 INJECTION, EMULSION INTRAVENOUS at 13:44

## 2020-09-11 RX ADMIN — LIDOCAINE HYDROCHLORIDE 60 MG: 20 INJECTION, SOLUTION INFILTRATION; PERINEURAL at 13:42

## 2020-09-11 RX ADMIN — PROPOFOL 30 MG: 10 INJECTION, EMULSION INTRAVENOUS at 13:42

## 2020-09-11 RX ADMIN — GABAPENTIN 300 MG: 300 CAPSULE ORAL at 11:21

## 2020-09-11 RX ADMIN — ONDANSETRON 4 MG: 2 INJECTION INTRAMUSCULAR; INTRAVENOUS at 13:49

## 2020-09-11 RX ADMIN — FENTANYL CITRATE 50 MCG: 50 INJECTION, SOLUTION INTRAMUSCULAR; INTRAVENOUS at 13:37

## 2020-09-11 RX ADMIN — ACETAMINOPHEN 975 MG: 325 TABLET ORAL at 11:19

## 2020-09-11 ASSESSMENT — MIFFLIN-ST. JEOR: SCORE: 1412.1

## 2020-09-11 NOTE — OP NOTE
Pre-operative diagnosis: Canalicular laceration, left, initial encounter [S01.112A]   Post-operative diagnosis Same   Procedure: Procedure(s):  left lower eyelid canalicular laceration repair with silicone intubation   Surgeon(s): Surgeon(s) and Role:     * Loc Adan MD - Primary      *Jennifer Yarbrough MD - Assistant   Estimated blood loss: 1cc          Specimens: * No specimens in log *   Findings: As expected     Procedure details:  Patient was brought to the operating room and placed in supine position.  After verification of correct patient, procedure and site, monitored anesthesia was administered.  The medial left upper and lower eyelid was infiltrated with 1% lidocaine with epinephrine 1:100,000.  The patient was then prepped and draped in usual sterile fashion for ophthalmic plastic surgery.  Attention was turned to the left lower eyelid.  A punctal dilator was used to dilated the lower and upper puncta.  A Walton probe was passed through the lower punctum/canaliculus and confirmed again laceration of the inferior canaliculus.  The skin was grossly healed from the prior surgery performed during her initial surgery and the Walton probe was not easily visible.  A Alice scissors was used to remove the old sutures and open the eyelid tissues to expose the probe.  The distal aspect of the canalicular laceration was not directly visible. The upper punctum was stenotic and a small snip punctoplasty was performed with Alice scissors. A pigtail probe was then inserted into the upper punctum and passed through the common canaliculus and out the inferior distal end of the laceration.  A Walton probe was then inserted into the identified distal end to confirm canalicular position and into the lacrimal sac and nasolacrimal duct.  A mini-Monoka stent was then trimmed and inserted into the inferior punctum and canaliculus then threaded into the distal end of the canaliculus and into the lacrimal sac.  The  lower eyelid was then re-secured in anatomic position to the medial canthal tendon using 5-0 vicryl suture and reinforced with an additional 5-0 buried suture.  The skin over the anterior medial eyelid was then closed with interrupted 6-0 plain gut sutures.  The palpebral conjunctiva along the posterior aspect of the lid laceration was then closed with a single 6-0 plain gut suture.  The skin was cleansed, erythromycin ointment applied, and the eye was covered with clear plastic eye shield.  There were no immediate complications and the patient tolerated the procedure well and was taken to recovery in stable condition.    Attending attestation: I was present and scrubbed for the entire procedure.

## 2020-09-11 NOTE — BRIEF OP NOTE
[facility] Brief Operative Note    Pre-operative diagnosis: Canalicular laceration, left, initial encounter [S01.112A]   Post-operative diagnosis Same   Procedure: Procedure(s):  left lower eyelid canalicular laceration repair with silicone intubation   Surgeon(s): Surgeon(s) and Role:     * Loc Adan MD - Primary   Estimated blood loss: 1cc    Specimens: * No specimens in log *   Findings: As expected

## 2020-09-11 NOTE — DISCHARGE INSTRUCTIONS
Post-operative Instructions  Ophthalmic Plastic and Reconstructive Surgery    Loc Adan MD    All instructions apply to the operated eye(s) or eyelid(s).  Wound care and personal care    ? If a patch or bandage has been placed, please leave this in place until seen by your physician. Ensure that the bandage does not get wet when you take a shower.  ? Apply ice compresses 15 minutes of every hour off while awake for 2 days. As long as there is no further bleeding, after two days, switch to warm water compresses for five minutes, four times a day until seen by your physician. Instead of warm water compresses, you can use a cup of dry uncooked rice in a clean cotton sock. Place sock in microwave for 30 seconds to one minute. Next place the warm sock in a plastic bag, and place it over a clean warm wet washcloth over operated eye.    ? You may shower or wash your hair the day after surgery. Do not bathe or go swimming for 1 week to prevent contamination of your wounds.  ? Do not apply make-up to the eyes or eyelids for 2 weeks after surgery.  ? Expect some swelling, bruising, black eye (even into the lower eyelids and cheeks). Also expect serum caking, crusting and discharge from the eye and/or incisions. A small amount of surface bleeding, and depending on the type of surgery, bleeding from the inside of the eyelid, is normal for the first 48 hours.  ? Avoid straining, bending at the waist, or lifting more than 15 pounds for 10 days. Activities that raise your blood pressure can worsen swelling, cause bleeding, and breaking of sutures. Like wise, sleeping with your head slightly elevated for the first several days can help swelling resolve more quickly.   ? Do continue to ambulate (walk) as you normally would - being sedentary after surgery can cause blood clots.   ? Your eye(s) and eyelid(s) may be painful and tender. This is normal after surgery.  ? If you had surgery involving the nose or tear drainage  system, the following wound  care instructions also apply to you :  ? Do not blow your nose or drink hot liquids for 1 week after your surgery.  ? Do not pull at the small tube in the nose or corner of the eye.  - wear the clear plastic shield over the eye while you are sleeping for 1 week    Contact information and follow-up  ? Return to the Eye Clinic for a follow-up appointment with your physician as  scheduled.    - Please e-mail pictures to [email or web address] in 1-2 weeks following your surgery.     ? For severe pain, bleeding, or loss of vision, during business hours call the [facility] at [phone] or [facility] at [phone].     After hours or on weekends and holidays, call [phone] and ask to speak with the ophthalmologist on call.    An on call person can be reached after hours for concerns. The on call doctor should not call in medication refill requests after hours or on weekends, so please plan accordingly. An effort has been made to provide adequate pain medications following every surgery, and refills will not be provided in most instances. Narcotic pain medications cannot be called in.     ? You may not resume driving if you are using narcotic pain medications  (such as Vicodin, Norco, Percocet, Tylenol #3, Oxycodone).    Medications  ? Restart all your regular home medications and eye drops. If you take Plavix or Aspirin on a regular basis, wait for 72 hours after your surgery before restarting these in order to decrease the risk of bleeding complications.  ? Avoid aspirin and aspirin-like medications (Motrin, Aleve, Ibuprofen, Geraldine-Met[place]ora etc) for 72 hours to reduce the risk of bleeding. You may take Tylenol  (acetaminophen) for pain.  ? In addition to your home medications, take the following post-operative medications as prescribed by your physician.  - Take the antibiotic pills as prescribed  ? Apply antibiotic ointment to all sutures four  times a day.   ? Instill antibiotic eye drops 4 times a day until follow up.   [facility]  Home Care Following Anesthesia  For 24 hours after surgery:  1. Get plenty of rest.  A responsible adult must stay with you for at least 24 hours after you leave the surgery center.  2. Do not drive or use heavy equipment.  If you have weakness or tingling, don't drive or use heavy equipment until this feeling goes away.   3. Do not drink alcohol.   4. Avoid strenuous or risky activities.  Ask for help when climbing stairs.  5. You may feel lightheaded.  IF so, sit for a few minutes before standing.  Have someone help you get up.   6. If you have nausea (feel sick to your stomach): Drink only clear liquids such as apple juice, ginger ale, broth or 7-Up.  Rest may also help.  Be sure to drink enough fluids.  Move to a regular diet as you feel able.   7. You may have a slight fever.  Call the doctor if your fever is over 100 F (37.7 C) (taken under the tongue) or lasts longer than 24 hours.  8. You may have a dry mouth, a sore throat, muscle aches or trouble sleeping. These should go away after 24 hours.  9. Do not make important or legal decisions.               Tips for taking pain medications  To get the best pain relief possible, remember these points:    Take pain medications as directed, before pain becomes severe.    Pain medication can upset your stomach: taking it with food may help.    Constipation is a common side effect of pain medication. Drink plenty of  fluids.    Eat foods high in fiber. Take a stool softener if recommended by your doctor or pharmacist.    Do not drink alcohol, drive or operate machinery while taking pain medications.    Ask about other ways to control pain, such as with heat, ice or relaxation.    Tylenol/Acetaminophen Consumption  To help encourage the safe use of acetaminophen, the makers of TYLENOL  have lowered the maximum daily dose for  single-ingredient Extra Strength TYLENOL  (acetaminophen) products sold in the [place] from 8 pills per day (4,000 mg) to 6 pills per day (3,000 mg). The dosing interval has also changed from 2 pills every 4-6 hours to 2 pills every 6 hours.    If you feel your pain relief is insufficient, you may take Tylenol/Acetaminophen in addition to your narcotic pain medication.     Be careful not to exceed 3,000 mg of Tylenol/Acetaminophen in a 24 hour period from all sources.    If you are taking extra strength Tylenol/acetaminophen (500 mg), the maximum dose is 6 tablets in 24 hours.    If you are taking regular strength acetaminophen (325 mg), the maximum dose is 9 tablets in 24 hours.    Call a doctor for any of the followin. Signs of infection (fever, growing tenderness at the surgery site, a large amount of drainage or bleeding, severe pain, foul-smelling drainage, redness, swelling).  2. It has been over 8 to 10 hours since surgery and you are still not able to urinate (pass water).  3. Headache for over 24 hours.  4. Numbness, tingling or weakness the day after surgery (if you had spinal anesthesia).  5. Signs of Covid-19 infection (temperature over 100 degrees, shortness of breath, cough, loss of taste/smell, generalized body aches, persistent headache, chills, sore throat, nausea/vomiting/diarrhea)  Your doctor is:  Dr. Loc Adan, Ophthalmology: [phone]               Or dial [phone] and ask for the resident on call for:  Ophthalmology  For emergency care, call the:  [facility] Emergency Department: [phone] (TTY for hearing impaired: [phone])

## 2020-09-11 NOTE — ANESTHESIA CARE TRANSFER NOTE
Patient: Didier Faustin    Procedure(s):  left lower eyelid canalicular laceration repair with silicone intubation    Diagnosis: Canalicular laceration, left, initial encounter [S01.112A]  Diagnosis Additional Information: No value filed.    Anesthesia Type:   MAC     Note:  Airway :Room Air  Patient transferred to:Phase II  Comments: 115/72  99%  97.5-87-16  Handoff Report: Identifed the Patient, Identified the Reponsible Provider, Reviewed the pertinent medical history, Discussed the surgical course, Reviewed Intra-OP anesthesia mangement and issues during anesthesia, Set expectations for post-procedure period and Allowed opportunity for questions and acknowledgement of understanding      Vitals: (Last set prior to Anesthesia Care Transfer)    CRNA VITALS  9/11/2020 1404 - 9/11/2020 1441      9/11/2020             Pulse:  96    SpO2:  100 %    Resp Rate (set):  10                Electronically Signed By: ALESIA Wells CRNA  September 11, 2020  2:41 PM

## 2020-09-11 NOTE — ANESTHESIA POSTPROCEDURE EVALUATION
Anesthesia POST Procedure Evaluation    Patient: Didier Faustin   MRN:     [id number] Gender:   female   Age:    34 year old :      1985        Preoperative Diagnosis: Canalicular laceration, left, initial encounter [S01.112A]   Procedure(s):  left lower eyelid canalicular laceration repair with silicone intubation   Postop Comments: No value filed.     Anesthesia Type: MAC       Disposition: Outpatient   Postop Pain Control: Uneventful            Sign Out: Well controlled pain   PONV: No   Neuro/Psych: Uneventful            Sign Out: Acceptable/Baseline neuro status   Airway/Respiratory: Uneventful            Sign Out: Acceptable/Baseline resp. status   CV/Hemodynamics: Uneventful            Sign Out: Acceptable CV status   Other NRE: NONE   DID A NON-ROUTINE EVENT OCCUR? No         Last Anesthesia Record Vitals:  CRNA VITALS  2020 1404 - 2020 1503      2020             Pulse:  96    SpO2:  100 %    Resp Rate (set):  10          Last PACU Vitals:  Vitals Value Taken Time   /72 2020  2:40 PM   Temp 36.6  C (97.9  F) 2020  2:40 PM   Pulse 90 2020  2:40 PM   Resp 22 2020  2:40 PM   SpO2 96 % 2020  2:40 PM   Temp src     NIBP 111/78 2020  2:31 PM   Pulse 96 2020  2:35 PM   SpO2 100 % 2020  2:35 PM   Resp     Temp 36.6  C (97.9  F) 2020  2:32 PM   Ht Rate 93 2020  2:33 PM   Temp 2 0  C (32  F) 2020  2:33 PM         Electronically Signed By: Gabe Vasquez MD, 2020, 3:03 PM

## 2020-09-14 ENCOUNTER — TELEPHONE (OUTPATIENT)
Dept: OPHTHALMOLOGY | Facility: CLINIC | Age: 35
End: 2020-09-14

## 2020-09-21 ENCOUNTER — TELEPHONE (OUTPATIENT)
Dept: OPHTHALMOLOGY | Facility: CLINIC | Age: 35
End: 2020-09-21

## 2020-09-21 ENCOUNTER — VIRTUAL VISIT (OUTPATIENT)
Dept: OPHTHALMOLOGY | Facility: CLINIC | Age: 35
End: 2020-09-21

## 2020-09-21 DIAGNOSIS — Z98.890 POSTOPERATIVE EYE STATE: Primary | ICD-10-CM

## 2020-09-21 NOTE — PROGRESS NOTES
"Didier Faustin is a 34 year old female who is being evaluated via a billable telephone visit.      The patient has been notified of following:     \"This telephone visit will be conducted via a call between you and your physician/provider. We have found that certain health care needs can be provided without the need for a physical exam.  This service lets us provide the care you need with a short phone conversation.  If a prescription is necessary we can send it directly to your pharmacy.  If lab work is needed we can place an order for that and you can then stop by our lab to have the test done at a later time.    Telephone visits are billed at different rates depending on your insurance coverage. During this emergency period, for some insurers they may be billed the same as an in-person visit.  Please reach out to your insurance provider with any questions.    If during the course of the call the physician/provider feels a telephone visit is not appropriate, you will not be charged for this service.\"    Patient has given verbal consent for Telephone visit?  Yes    What phone number would you like to be contacted at? Mobile    How would you like to obtain your AVS? MyChart    Phone call duration: 2 minutes    I attempted to call Didier and left two voicemails for her to call back when she gets a chance to discuss her post-op course.      Loc Adan MD      Attending Physician Attestation:  I personally called this patient. Complete documentation of historical and exam elements from today's encounter can be found in the full encounter summary report (not reduplicated in this progress note). I was unable to reach this patient after several attempts.    -Loc Adan MD      "

## 2020-11-16 ENCOUNTER — OFFICE VISIT (OUTPATIENT)
Dept: OPHTHALMOLOGY | Facility: CLINIC | Age: 35
End: 2020-11-16

## 2020-11-16 DIAGNOSIS — Z98.890 POSTOPERATIVE EYE STATE: Primary | ICD-10-CM

## 2020-11-16 PROCEDURE — 99024 POSTOP FOLLOW-UP VISIT: CPT | Performed by: OPHTHALMOLOGY

## 2020-11-16 ASSESSMENT — TONOMETRY
OD_IOP_MMHG: 10
IOP_METHOD: ICARE
OS_IOP_MMHG: 13

## 2020-11-16 ASSESSMENT — VISUAL ACUITY
OD_SC: 20/30
METHOD: SNELLEN - LINEAR
OS_SC: 20/50
OS_SC+: -2

## 2020-11-16 NOTE — PROGRESS NOTES
Chief Complaints and History of Present Illnesses   Patient presents with     Post Op (Ophthalmology) Left Eye     Chief Complaint(s) and History of Present Illness(es)     Post Op (Ophthalmology) Left Eye     In left eye.  This started months ago.  Occurring constantly.  Since   onset it is gradually improving.  Associated symptoms include tearing and   discharge.  Treatments tried include artificial tears.  Pain was noted as   0/10.              Comments     S/p left lower eyelid canalicular laceration repair with silicone   intubation on 9/11/2020. Pt states still tearing with discharge in the   left eye. Using OTC tears PRN each eye.    Steven Stoll, COT COT 11:25 AM November 16, 2020                      Assessment & Plan     Didier Faustin is a 34 year old female with the following diagnoses:   1. Postoperative eye state       Didier Faustin is 8 weeks status post left lower canalicular involving laceration  The incision(s) is healing well.  The lid(s) is in excellent position.  Mini-monoka removed in clinic today, punctum in good position, lid position and tension excellent    I have recommended:  * Continue antibiotic ointment or bland lubricating ointment (eg vaseline or aquaphor) to the incision site BID  * Massage along the incision BID      Attending Physician Attestation: Complete documentation of historical and exam elements from today's encounter can be found in the full encounter summary report (not reduplicated in this progress note). I personally obtained the chief complaint(s) and history of present illness. I confirmed and edited as necessary the review of systems, past medical/surgical history, family history, social history, and examination findings as documented by others; and I examined the patient myself. I personally reviewed the relevant tests, images, and reports as documented above. I formulated and edited as necessary the assessment and plan and discussed the findings and management  plan with the patient.  -Loc Adan MD

## 2020-11-16 NOTE — NURSING NOTE
Chief Complaints and History of Present Illnesses   Patient presents with     Post Op (Ophthalmology) Left Eye     Chief Complaint(s) and History of Present Illness(es)     Post Op (Ophthalmology) Left Eye     Laterality: left eye    Onset: months ago    Frequency: constantly    Course: gradually improving    Associated symptoms: tearing and discharge    Treatments tried: artificial tears    Pain scale: 0/10              Comments     S/p left lower eyelid canalicular laceration repair with silicone intubation on 9/11/2020. Pt states still tearing with discharge in the left eye. Using OTC tears PRN each eye.    ALIE Sullivan COT 11:25 AM November 16, 2020

## 2020-11-29 ENCOUNTER — HEALTH MAINTENANCE LETTER (OUTPATIENT)
Age: 35
End: 2020-11-29

## 2021-09-19 ENCOUNTER — HEALTH MAINTENANCE LETTER (OUTPATIENT)
Age: 36
End: 2021-09-19

## 2022-01-09 ENCOUNTER — HEALTH MAINTENANCE LETTER (OUTPATIENT)
Age: 37
End: 2022-01-09

## 2022-11-21 ENCOUNTER — HEALTH MAINTENANCE LETTER (OUTPATIENT)
Age: 37
End: 2022-11-21

## 2023-04-16 ENCOUNTER — HEALTH MAINTENANCE LETTER (OUTPATIENT)
Age: 38
End: 2023-04-16

## 2024-02-04 ENCOUNTER — HEALTH MAINTENANCE LETTER (OUTPATIENT)
Age: 39
End: 2024-02-04

## (undated) DEVICE — SOL WATER IRRIG 1000ML BOTTLE 2F7114

## (undated) DEVICE — PEN MARKING SKIN TYCO DEVON DUAL TIP 31145868

## (undated) DEVICE — COVER CAMERA IN-LIGHT DISP LT-C02

## (undated) DEVICE — PACK MINOR EYE CUSTOM ASC

## (undated) DEVICE — ESU EYE HIGH TEMP 65410-183

## (undated) DEVICE — BLADE KNIFE BEAVER MICROSHARP GREEN 377515

## (undated) DEVICE — EYE PREP BETADINE 5% SOLUTION 30ML 0065-0411-30

## (undated) DEVICE — LINEN TOWEL PACK X5 5464

## (undated) DEVICE — GLOVE PROTEXIS MICRO 7.5  2D73PM75

## (undated) DEVICE — BLADE KNIFE SURG 15 371115

## (undated) DEVICE — PAD ARMBOARD FOAM EGGCRATE COVIDEN 3114367

## (undated) DEVICE — SOL NACL 0.9% IRRIG 1000ML BOTTLE 2F7124

## (undated) RX ORDER — PROPOFOL 10 MG/ML
INJECTION, EMULSION INTRAVENOUS
Status: DISPENSED
Start: 2020-09-11

## (undated) RX ORDER — FENTANYL CITRATE 50 UG/ML
INJECTION, SOLUTION INTRAMUSCULAR; INTRAVENOUS
Status: DISPENSED
Start: 2020-09-11

## (undated) RX ORDER — ERYTHROMYCIN 5 MG/G
OINTMENT OPHTHALMIC
Status: DISPENSED
Start: 2020-09-11

## (undated) RX ORDER — LIDOCAINE HYDROCHLORIDE AND EPINEPHRINE 10; 10 MG/ML; UG/ML
INJECTION, SOLUTION INFILTRATION; PERINEURAL
Status: DISPENSED
Start: 2020-09-11

## (undated) RX ORDER — ONDANSETRON 2 MG/ML
INJECTION INTRAMUSCULAR; INTRAVENOUS
Status: DISPENSED
Start: 2020-09-11

## (undated) RX ORDER — TETRACAINE HYDROCHLORIDE 5 MG/ML
SOLUTION OPHTHALMIC
Status: DISPENSED
Start: 2020-09-11